# Patient Record
Sex: MALE | URBAN - METROPOLITAN AREA
[De-identification: names, ages, dates, MRNs, and addresses within clinical notes are randomized per-mention and may not be internally consistent; named-entity substitution may affect disease eponyms.]

---

## 2019-02-04 ENCOUNTER — IMPORTED ENCOUNTER (OUTPATIENT)
Dept: URBAN - METROPOLITAN AREA CLINIC 1 | Facility: CLINIC | Age: 59
End: 2019-02-04

## 2019-02-04 PROBLEM — H25.813: Noted: 2019-02-04

## 2019-02-04 PROBLEM — H43.812: Noted: 2019-02-04

## 2019-02-04 PROBLEM — H35.361: Noted: 2019-02-04

## 2019-02-04 PROCEDURE — 92004 COMPRE OPH EXAM NEW PT 1/>: CPT

## 2019-02-04 NOTE — PATIENT DISCUSSION
1.  PVD w/o Tear OS -- RD Precautions given. Patient was cautioned to call our office immediately if they experience   a substantial change in their symptoms such as an increase in floaters persistent flashes loss of visual field (may appear as a shadow or a curtain) or decrease in visual acuity as these may indicate a retinal tear or detachment. 2.  Cataracts OU -- Observe for now without intervention. The patient was advised to contact us if any change or worsening of vision. 3. Macular Drusen OD -- Observe / Monitor. Patient defers the refraction at today's visit. Return for an appointment in 4-6 WKS for a 10 / DFE (PVD Recheck OS) with Dr. Lurdes Lepe.

## 2019-03-18 ENCOUNTER — IMPORTED ENCOUNTER (OUTPATIENT)
Dept: URBAN - METROPOLITAN AREA CLINIC 1 | Facility: CLINIC | Age: 59
End: 2019-03-18

## 2019-03-18 PROBLEM — H43.812: Noted: 2019-03-18

## 2019-03-18 PROBLEM — H25.813: Noted: 2019-03-18

## 2019-03-18 PROCEDURE — 99213 OFFICE O/P EST LOW 20 MIN: CPT

## 2019-03-18 NOTE — PATIENT DISCUSSION
1.  PVD w/o Tear OS -- RD Precautions given. Patient was cautioned to call our office immediately if they experience   a substantial change in their symptoms such as an increase in floaters persistent flashes loss of visual field (may appear as a shadow or a curtain) or decrease in visual acuity as these may indicate a retinal tear or detachment. 2.  Cataracts OU -- Observe for now without intervention. The patient was advised to contact us if any change or worsening of vision. 3. H/o Macular Drusen ODReturn for an appointment in 1 YR for a 27 OU with Dr. Mary Green.

## 2019-03-22 ENCOUNTER — OFFICE VISIT (OUTPATIENT)
Dept: FAMILY MEDICINE CLINIC | Age: 59
End: 2019-03-22

## 2019-03-22 VITALS
HEART RATE: 62 BPM | RESPIRATION RATE: 16 BRPM | DIASTOLIC BLOOD PRESSURE: 87 MMHG | BODY MASS INDEX: 25.79 KG/M2 | SYSTOLIC BLOOD PRESSURE: 141 MMHG | OXYGEN SATURATION: 99 % | TEMPERATURE: 96.4 F | HEIGHT: 71 IN | WEIGHT: 184.2 LBS

## 2019-03-22 DIAGNOSIS — Z13.0 SCREENING FOR ENDOCRINE, NUTRITIONAL, METABOLIC AND IMMUNITY DISORDER: ICD-10-CM

## 2019-03-22 DIAGNOSIS — R06.00 DYSPNEA, UNSPECIFIED TYPE: ICD-10-CM

## 2019-03-22 DIAGNOSIS — Z00.00 PHYSICAL EXAM: Primary | ICD-10-CM

## 2019-03-22 DIAGNOSIS — Z13.228 SCREENING FOR ENDOCRINE, NUTRITIONAL, METABOLIC AND IMMUNITY DISORDER: ICD-10-CM

## 2019-03-22 DIAGNOSIS — M25.50 MULTIPLE JOINT PAIN: ICD-10-CM

## 2019-03-22 DIAGNOSIS — Z13.21 SCREENING FOR ENDOCRINE, NUTRITIONAL, METABOLIC AND IMMUNITY DISORDER: ICD-10-CM

## 2019-03-22 DIAGNOSIS — Z11.59 NEED FOR HEPATITIS C SCREENING TEST: ICD-10-CM

## 2019-03-22 DIAGNOSIS — Z77.098 EXPOSURE TO CHEMICAL INHALATION: ICD-10-CM

## 2019-03-22 DIAGNOSIS — Z13.29 SCREENING FOR ENDOCRINE, NUTRITIONAL, METABOLIC AND IMMUNITY DISORDER: ICD-10-CM

## 2019-03-22 NOTE — PROGRESS NOTES
Chief Complaint Patient presents with [facility] 1. Have you been to the ER, urgent care clinic since your last visit? Hospitalized since your last visit? No 
 
2. Have you seen or consulted any other health care providers outside of the [address] [place] [address] since your last visit? Include any pap smears or colon screening. Dr Britt Manley

## 2019-03-22 NOTE — PROGRESS NOTES
HISTORY OF PRESENT ILLNESS Jean Herndon is a 62 y.o. male. Patient is here to establish care. He mentions he was diagnosed with SVT about ten years back and he was given a shot of medication and the palpitations stopped. He has not had insurance in a few years. He mentions he was seeing a PCP some years back and he had a colonoscopy and it was a normal report. He also had [facility] screening last week and he had some blood work done. From that blood work he was told he may possible be pre-diabetic. He had a EKG / ultrasound test done for AAA screening. He mentions he will bring these results in for me to see. Patient does work physically at work and he has not been doing cardiovascular exercise. He does eat healthy. He does get short of breathe every now and then and he is concerned in regards to that as he has been working with chemicals and in construction for years. He does not get short of breathe walking long distances. Establish Care The history is provided by the patient. This is a new problem. The problem occurs constantly. The problem has not changed since onset. Associated symptoms include shortness of breath. Pertinent negatives include no chest pain, no abdominal pain and no headaches. Nothing aggravates the symptoms. Nothing relieves the symptoms. He has tried nothing for the symptoms. Review of Systems Constitutional: Negative for chills, diaphoresis, fever and malaise/fatigue. HENT: Negative for congestion, ear discharge, ear pain, hearing loss, sinus pain and sore throat. Eyes: Negative for blurred vision, double vision, pain and discharge. Respiratory: Positive for shortness of breath. Negative for cough and sputum production. Cardiovascular: Negative for chest pain, palpitations and leg swelling. Gastrointestinal: Negative for abdominal pain, constipation, diarrhea, nausea and vomiting. Genitourinary: Negative for dysuria, frequency and hematuria. Musculoskeletal: Positive for joint pain. Neurological: Negative for tingling, focal weakness, seizures, weakness and headaches. Endo/Heme/Allergies: Negative for environmental allergies. Psychiatric/Behavioral: Negative for depression. The patient is not nervous/anxious. Visit Vitals /87 Pulse 62 Temp 96.4 °F (35.8 °C) (Oral) Resp 16 Ht 5' 11\" (1.803 m) Wt 184 lb 3.2 oz (83.6 kg) SpO2 99% BMI 25.69 kg/m² Physical Exam  
Constitutional: He is oriented to person, place, and time. He appears well-developed and well-nourished. No distress. HENT:  
Head: Normocephalic and atraumatic. Right Ear: External ear normal.  
Left Ear: External ear normal.  
Mouth/Throat: Oropharynx is clear and moist. No oropharyngeal exudate. Eyes: Pupils are equal, round, and reactive to light. EOM are normal. No scleral icterus. Neck: Normal range of motion. No thyromegaly present. Cardiovascular: Normal rate, regular rhythm and normal heart sounds. Pulmonary/Chest: Effort normal and breath sounds normal. No respiratory distress. He has no wheezes. Abdominal: Soft. Bowel sounds are normal. He exhibits no distension. There is no tenderness. Lymphadenopathy:  
  He has no cervical adenopathy. Neurological: He is alert and oriented to person, place, and time. Psychiatric: He has a normal mood and affect. ASSESSMENT and PLAN Physical exam: 
1) Please make sure you have a routine physical exam every 1-2 years. 2) Annual check up with eye doctor and dentist. 
3) Colorectal cancer screening with colonoscopy every ten years at age 48. Depending on certain risk factors screening may need to be done earlier. 4) Rectal exam and PSA screening at age of 48, screening may be done earlier depending on certain risk factors as discussed. ) Bone density testing starting at the age of 72.  
) Routine blood work to be ordered as part of physical exam and has been discussed with patient. ) Screening for STD's/HIV. ) Exercise at least 30 min 3-5 times a week for goal BMI of less than or equal to 25. Please make sure you wear a seat belt while driving daily , helmet safety discussed. ) Please avoid smoking , alcohol and illicit drug use. ) Daily requirement of calcium is 1200 mg per day and 1000 IU of vitamin D. ) Please make sure all immunizations are up to date: - Influenza vaccine every year  
     - Tdap every 10 years - Pneumococcal vaccine starting at age of 72 
     - Shingles at age 59 Hep c screening has been ordered Patient will be bringing by previous reports with [facility] screening done 3 months back for me to review. Dyspnea intermittently and chemical exposure: - Discussed ordering chest x-ray Multiple joint pain / history of arthritis : 
- discussed ordering vitamin d

## 2019-03-25 ENCOUNTER — TELEPHONE (OUTPATIENT)
Dept: FAMILY MEDICINE CLINIC | Age: 59
End: 2019-03-25

## 2019-03-25 DIAGNOSIS — R68.89 DECREASED EXERCISE TOLERANCE: ICD-10-CM

## 2019-03-25 DIAGNOSIS — R06.09 DYSPNEA ON EXERTION: Primary | ICD-10-CM

## 2019-03-25 NOTE — TELEPHONE ENCOUNTER
Spoke to patient in regards to his chest x-ray and he mentions as he walks long distance and does more he gets short of breathe. I have dicussed ordering an exercise stress test and he verbalizes an understanding.

## 2019-04-11 ENCOUNTER — HOSPITAL ENCOUNTER (OUTPATIENT)
Dept: NON INVASIVE DIAGNOSTICS | Age: 59
Discharge: HOME | End: 2019-04-11
Attending: FAMILY MEDICINE
Payer: COMMERCIAL

## 2019-04-11 VITALS
DIASTOLIC BLOOD PRESSURE: 85 MMHG | BODY MASS INDEX: 24.92 KG/M2 | WEIGHT: 178 LBS | HEIGHT: 71 IN | SYSTOLIC BLOOD PRESSURE: 130 MMHG

## 2019-04-11 DIAGNOSIS — R06.09 DYSPNEA ON EXERTION: ICD-10-CM

## 2019-04-11 DIAGNOSIS — R68.89 DECREASED EXERCISE TOLERANCE: ICD-10-CM

## 2019-04-11 LAB
STRESS ANGINA INDEX: 0
STRESS BASELINE HR: 58 BPM
STRESS ESTIMATED WORKLOAD: 10.9 METS
STRESS EXERCISE DUR MIN: NORMAL
STRESS PEAK DIAS BP: 80 MMHG
STRESS PEAK SYS BP: 168 MMHG
STRESS PERCENT HR ACHIEVED: 86 %
STRESS POST PEAK HR: 137 BPM
STRESS RATE PRESSURE PRODUCT: NORMAL BPM*MMHG
STRESS SR DUKE TREADMILL SCORE: -5
STRESS ST DEPRESSION: 1 MM
STRESS ST ELEVATION: 0 MM
STRESS TARGET HR: 159 BPM

## 2019-04-11 PROCEDURE — 93017 CV STRESS TEST TRACING ONLY: CPT

## 2019-04-22 ENCOUNTER — TELEPHONE (OUTPATIENT)
Dept: FAMILY MEDICINE CLINIC | Age: 59
End: 2019-04-22

## 2019-04-22 NOTE — TELEPHONE ENCOUNTER
Called patient and spoke to him about his stress test. He mentions he got fatigued and explained the findings.

## 2022-04-02 ASSESSMENT — TONOMETRY
OS_IOP_MMHG: 17
OS_IOP_MMHG: 14
OD_IOP_MMHG: 17
OD_IOP_MMHG: 14

## 2022-04-02 ASSESSMENT — VISUAL ACUITY
OS_CC: 20/25
OD_CC: J1+
OS_CC: 20/25-2
OS_CC: J1+
OD_CC: 20/20
OD_CC: 20/20